# Patient Record
Sex: FEMALE | Race: ASIAN | Employment: UNEMPLOYED | URBAN - METROPOLITAN AREA
[De-identification: names, ages, dates, MRNs, and addresses within clinical notes are randomized per-mention and may not be internally consistent; named-entity substitution may affect disease eponyms.]

---

## 2017-06-22 ENCOUNTER — HOSPITAL ENCOUNTER (OUTPATIENT)
Age: 27
Discharge: HOME | End: 2017-06-22
Payer: COMMERCIAL

## 2017-06-22 VITALS
HEART RATE: 82 BPM | HEIGHT: 64 IN | BODY MASS INDEX: 23.05 KG/M2 | WEIGHT: 135 LBS | OXYGEN SATURATION: 100 % | TEMPERATURE: 99 F | SYSTOLIC BLOOD PRESSURE: 113 MMHG | DIASTOLIC BLOOD PRESSURE: 65 MMHG | RESPIRATION RATE: 20 BRPM

## 2017-06-22 DIAGNOSIS — N94.6 DYSMENORRHEA: Primary | ICD-10-CM

## 2017-06-22 PROCEDURE — 99203 OFFICE O/P NEW LOW 30 MIN: CPT

## 2017-06-22 PROCEDURE — 99202 OFFICE O/P NEW SF 15 MIN: CPT

## 2017-06-22 PROCEDURE — 81025 URINE PREGNANCY TEST: CPT

## 2017-06-22 PROCEDURE — 81002 URINALYSIS NONAUTO W/O SCOPE: CPT

## 2017-06-22 NOTE — ED PROVIDER NOTES
Patient presents with:  Abdomen/Flank Pain (GI/)      HPI:     Emily Collier is a 27year old female who presents with a chief complaint of dysmenorrhea and illness prior and during her menses.   The patient states her primary care doctor is aware, and she ha systems reviewed and negative except as noted above. Constitutional and Vital Signs Reviewed.       Physical Exam:     /65 mmHg  Pulse 82  Temp(Src) 98.8 °F (37.1 °C)  Resp 20  Ht 162.6 cm (5' 4\")  Wt 61.236 kg  BMI 23.16 kg/m2  SpO2 100%  LMP 05/29 Negative   Leukocyte esterase urine Negative Negative        MDM:  She has moderate microscopic blood in the urine, but she is due for her menses any day now. Her pregnancy test is negative. Her vital signs are stable.   She declined a pelvic exam today,

## 2017-06-27 ENCOUNTER — HOSPITAL ENCOUNTER (EMERGENCY)
Facility: HOSPITAL | Age: 27
Discharge: HOME | End: 2017-06-27
Attending: EMERGENCY MEDICINE
Payer: COMMERCIAL

## 2017-06-27 ENCOUNTER — APPOINTMENT (OUTPATIENT)
Dept: CT IMAGING | Facility: HOSPITAL | Age: 27
End: 2017-06-27
Attending: EMERGENCY MEDICINE
Payer: COMMERCIAL

## 2017-06-27 VITALS
OXYGEN SATURATION: 100 % | WEIGHT: 136 LBS | TEMPERATURE: 97 F | HEART RATE: 60 BPM | DIASTOLIC BLOOD PRESSURE: 74 MMHG | SYSTOLIC BLOOD PRESSURE: 124 MMHG | BODY MASS INDEX: 23.22 KG/M2 | RESPIRATION RATE: 16 BRPM | HEIGHT: 64 IN

## 2017-06-27 DIAGNOSIS — G44.209 TENSION-TYPE HEADACHE, NOT INTRACTABLE, UNSPECIFIED CHRONICITY PATTERN: Primary | ICD-10-CM

## 2017-06-27 DIAGNOSIS — N94.6 DYSMENORRHEA: ICD-10-CM

## 2017-06-27 DIAGNOSIS — L02.211 CUTANEOUS ABSCESS OF ABDOMINAL WALL: ICD-10-CM

## 2017-06-27 PROCEDURE — 85025 COMPLETE CBC W/AUTO DIFF WBC: CPT | Performed by: EMERGENCY MEDICINE

## 2017-06-27 PROCEDURE — 87186 SC STD MICRODIL/AGAR DIL: CPT | Performed by: EMERGENCY MEDICINE

## 2017-06-27 PROCEDURE — 96374 THER/PROPH/DIAG INJ IV PUSH: CPT

## 2017-06-27 PROCEDURE — 96375 TX/PRO/DX INJ NEW DRUG ADDON: CPT

## 2017-06-27 PROCEDURE — 81001 URINALYSIS AUTO W/SCOPE: CPT | Performed by: EMERGENCY MEDICINE

## 2017-06-27 PROCEDURE — 93005 ELECTROCARDIOGRAM TRACING: CPT

## 2017-06-27 PROCEDURE — 87205 SMEAR GRAM STAIN: CPT | Performed by: EMERGENCY MEDICINE

## 2017-06-27 PROCEDURE — 87070 CULTURE OTHR SPECIMN AEROBIC: CPT | Performed by: EMERGENCY MEDICINE

## 2017-06-27 PROCEDURE — 99285 EMERGENCY DEPT VISIT HI MDM: CPT

## 2017-06-27 PROCEDURE — 70450 CT HEAD/BRAIN W/O DYE: CPT | Performed by: EMERGENCY MEDICINE

## 2017-06-27 PROCEDURE — 93010 ELECTROCARDIOGRAM REPORT: CPT

## 2017-06-27 PROCEDURE — 81025 URINE PREGNANCY TEST: CPT

## 2017-06-27 PROCEDURE — 80053 COMPREHEN METABOLIC PANEL: CPT | Performed by: EMERGENCY MEDICINE

## 2017-06-27 PROCEDURE — 87147 CULTURE TYPE IMMUNOLOGIC: CPT | Performed by: EMERGENCY MEDICINE

## 2017-06-27 PROCEDURE — 96361 HYDRATE IV INFUSION ADD-ON: CPT

## 2017-06-27 RX ORDER — METOCLOPRAMIDE HYDROCHLORIDE 5 MG/ML
10 INJECTION INTRAMUSCULAR; INTRAVENOUS ONCE
Status: COMPLETED | OUTPATIENT
Start: 2017-06-27 | End: 2017-06-27

## 2017-06-27 RX ORDER — BUTALBITAL/ASPIRIN/CAFFEINE 50-325-40
1-2 CAPSULE ORAL EVERY 4 HOURS PRN
Qty: 12 CAPSULE | Refills: 0 | Status: SHIPPED | OUTPATIENT
Start: 2017-06-27 | End: 2018-06-28

## 2017-06-27 RX ORDER — HYDROMORPHONE HYDROCHLORIDE 1 MG/ML
0.5 INJECTION, SOLUTION INTRAMUSCULAR; INTRAVENOUS; SUBCUTANEOUS ONCE
Status: COMPLETED | OUTPATIENT
Start: 2017-06-27 | End: 2017-06-27

## 2017-06-27 RX ORDER — KETOROLAC TROMETHAMINE 30 MG/ML
30 INJECTION, SOLUTION INTRAMUSCULAR; INTRAVENOUS ONCE
Status: COMPLETED | OUTPATIENT
Start: 2017-06-27 | End: 2017-06-27

## 2017-06-27 RX ORDER — DIPHENHYDRAMINE HYDROCHLORIDE 50 MG/ML
25 INJECTION INTRAMUSCULAR; INTRAVENOUS ONCE
Status: COMPLETED | OUTPATIENT
Start: 2017-06-27 | End: 2017-06-27

## 2017-06-27 RX ORDER — SULFAMETHOXAZOLE AND TRIMETHOPRIM 800; 160 MG/1; MG/1
1 TABLET ORAL 2 TIMES DAILY
Qty: 14 TABLET | Refills: 0 | Status: ON HOLD | OUTPATIENT
Start: 2017-06-27 | End: 2017-07-04

## 2017-06-27 NOTE — ED INITIAL ASSESSMENT (HPI)
Patient said her period started last Thursday. Said she has been passing \"blood clots\" from her vagina since. Patient said she went to an urgent care that same day. Was diagnosed with dysmenorrhea.  A couple days ago, patient said she developed a headache

## 2017-06-28 NOTE — ED PROVIDER NOTES
Patient Seen in: [facility] Emergency Department    History   Patient presents with:  Headache (neurologic)  Fall (musculoskeletal, neurologic)    Stated Complaint: headache/fall/syncope    HPI    Patient is a 27-year-old woman who presents with mul above.    PSFH elements reviewed from today and agreed except as otherwise stated in HPI.     Physical Exam   ED Triage Vitals [06/27/17 1328]  BP: 111/70  Pulse: 86  Resp: 16  Temp: (!) 97.2 °F (36.2 °C)  Temp src: Temporal  SpO2: 100 %  O2 Device: None (R DIFFERENTIAL[[id number]]          Abnormal            Final result                 Please view results for these tests on the individual orders.    POCT PREGNANCY, URINE   RAINBOW DRAW BLUE   RAINBOW DRAW GOLD   RAINBOW DRAW LAVENDER   RAINBOW DRAW LIGHT GRE [address] [facility]  [phone]    In 2 days        Medications Prescribed:  Discharge Medication List as of 6/27/2017  6:44 PM    START t

## 2017-06-30 ENCOUNTER — HOSPITAL ENCOUNTER (INPATIENT)
Facility: HOSPITAL | Age: 27
LOS: 1 days | Discharge: HOME | DRG: 103 | End: 2017-07-04
Attending: EMERGENCY MEDICINE | Admitting: HOSPITALIST
Payer: COMMERCIAL

## 2017-06-30 DIAGNOSIS — R55 SYNCOPE AND COLLAPSE: Primary | ICD-10-CM

## 2017-06-30 RX ORDER — DIPHENHYDRAMINE HYDROCHLORIDE 50 MG/ML
25 INJECTION INTRAMUSCULAR; INTRAVENOUS ONCE
Status: COMPLETED | OUTPATIENT
Start: 2017-06-30 | End: 2017-07-01

## 2017-07-01 ENCOUNTER — APPOINTMENT (OUTPATIENT)
Dept: CT IMAGING | Facility: HOSPITAL | Age: 27
DRG: 103 | End: 2017-07-01
Attending: HOSPITALIST
Payer: COMMERCIAL

## 2017-07-01 ENCOUNTER — APPOINTMENT (OUTPATIENT)
Dept: MRI IMAGING | Facility: HOSPITAL | Age: 27
DRG: 103 | End: 2017-07-01
Attending: HOSPITALIST
Payer: COMMERCIAL

## 2017-07-01 ENCOUNTER — APPOINTMENT (OUTPATIENT)
Dept: GENERAL RADIOLOGY | Facility: HOSPITAL | Age: 27
DRG: 103 | End: 2017-07-01
Attending: HOSPITALIST
Payer: COMMERCIAL

## 2017-07-01 ENCOUNTER — APPOINTMENT (OUTPATIENT)
Dept: CV DIAGNOSTICS | Facility: HOSPITAL | Age: 27
DRG: 103 | End: 2017-07-01
Attending: HOSPITALIST
Payer: COMMERCIAL

## 2017-07-01 PROBLEM — R55 SYNCOPE AND COLLAPSE: Status: ACTIVE | Noted: 2017-07-01

## 2017-07-01 PROCEDURE — 80053 COMPREHEN METABOLIC PANEL: CPT | Performed by: EMERGENCY MEDICINE

## 2017-07-01 PROCEDURE — 74177 CT ABD & PELVIS W/CONTRAST: CPT | Performed by: HOSPITALIST

## 2017-07-01 PROCEDURE — 99245 OFF/OP CONSLTJ NEW/EST HI 55: CPT | Performed by: OTHER

## 2017-07-01 PROCEDURE — 71010 XR CHEST AP PORTABLE  (CPT=71010): CPT | Performed by: HOSPITALIST

## 2017-07-01 PROCEDURE — 70544 MR ANGIOGRAPHY HEAD W/O DYE: CPT | Performed by: HOSPITALIST

## 2017-07-01 PROCEDURE — 70551 MRI BRAIN STEM W/O DYE: CPT | Performed by: HOSPITALIST

## 2017-07-01 PROCEDURE — 84443 ASSAY THYROID STIM HORMONE: CPT | Performed by: EMERGENCY MEDICINE

## 2017-07-01 PROCEDURE — 84484 ASSAY OF TROPONIN QUANT: CPT | Performed by: EMERGENCY MEDICINE

## 2017-07-01 PROCEDURE — 93306 TTE W/DOPPLER COMPLETE: CPT | Performed by: HOSPITALIST

## 2017-07-01 PROCEDURE — 85025 COMPLETE CBC W/AUTO DIFF WBC: CPT | Performed by: EMERGENCY MEDICINE

## 2017-07-01 PROCEDURE — 71260 CT THORAX DX C+: CPT | Performed by: HOSPITALIST

## 2017-07-01 PROCEDURE — 99223 1ST HOSP IP/OBS HIGH 75: CPT | Performed by: HOSPITALIST

## 2017-07-01 RX ORDER — SODIUM CHLORIDE 9 MG/ML
INJECTION, SOLUTION INTRAVENOUS CONTINUOUS
Status: DISCONTINUED | OUTPATIENT
Start: 2017-07-01 | End: 2017-07-04

## 2017-07-01 RX ORDER — ACETAMINOPHEN 325 MG/1
650 TABLET ORAL EVERY 6 HOURS PRN
Status: DISCONTINUED | OUTPATIENT
Start: 2017-07-01 | End: 2017-07-04

## 2017-07-01 RX ORDER — ENOXAPARIN SODIUM 100 MG/ML
40 INJECTION SUBCUTANEOUS NIGHTLY
Status: DISCONTINUED | OUTPATIENT
Start: 2017-07-01 | End: 2017-07-04

## 2017-07-01 RX ORDER — TRAZODONE HYDROCHLORIDE 50 MG/1
50 TABLET ORAL NIGHTLY PRN
Status: DISCONTINUED | OUTPATIENT
Start: 2017-07-01 | End: 2017-07-04

## 2017-07-01 RX ORDER — BUTALBITAL, ACETAMINOPHEN AND CAFFEINE 50; 325; 40 MG/1; MG/1; MG/1
1 TABLET ORAL EVERY 4 HOURS PRN
Status: DISCONTINUED | OUTPATIENT
Start: 2017-07-01 | End: 2017-07-04

## 2017-07-01 RX ORDER — KETOROLAC TROMETHAMINE 30 MG/ML
30 INJECTION, SOLUTION INTRAMUSCULAR; INTRAVENOUS ONCE
Status: COMPLETED | OUTPATIENT
Start: 2017-07-01 | End: 2017-07-01

## 2017-07-01 RX ORDER — SULFAMETHOXAZOLE AND TRIMETHOPRIM 800; 160 MG/1; MG/1
1 TABLET ORAL 2 TIMES DAILY
Status: COMPLETED | OUTPATIENT
Start: 2017-07-01 | End: 2017-07-02

## 2017-07-01 RX ORDER — RIZATRIPTAN BENZOATE 5 MG/1
5 TABLET, ORALLY DISINTEGRATING ORAL 2 TIMES DAILY PRN
Status: DISCONTINUED | OUTPATIENT
Start: 2017-07-01 | End: 2017-07-04

## 2017-07-01 RX ORDER — DEXAMETHASONE SODIUM PHOSPHATE 10 MG/ML
10 INJECTION, SOLUTION INTRAMUSCULAR; INTRAVENOUS ONCE
Status: DISCONTINUED | OUTPATIENT
Start: 2017-07-01 | End: 2017-07-01

## 2017-07-01 RX ORDER — RIZATRIPTAN BENZOATE 5 MG/1
5 TABLET, ORALLY DISINTEGRATING ORAL
Status: DISCONTINUED | OUTPATIENT
Start: 2017-07-01 | End: 2017-07-01

## 2017-07-01 RX ORDER — ONDANSETRON 2 MG/ML
8 INJECTION INTRAMUSCULAR; INTRAVENOUS EVERY 6 HOURS PRN
Status: DISCONTINUED | OUTPATIENT
Start: 2017-07-01 | End: 2017-07-04

## 2017-07-01 RX ORDER — KETOROLAC TROMETHAMINE 30 MG/ML
15 INJECTION, SOLUTION INTRAMUSCULAR; INTRAVENOUS EVERY 6 HOURS PRN
Status: DISPENSED | OUTPATIENT
Start: 2017-07-01 | End: 2017-07-03

## 2017-07-01 NOTE — PAYOR COMM NOTE
--------------  7/1    ED  ADMISSION REVIEW            Patient presents with:  Syncope  X 3 TODAY    Stated Complaint: 3 syncopal episodes today        Patient is a 44-year-old woman coming with multiple syncopal episodes.     Also complains of headache thi deficit. She exhibits normal muscle tone. Coordination normal.   Skin: Skin is warm and dry. No rash noted. No erythema. ED Course[SA. 1]     Labs Reviewed   COMP METABOLIC PANEL (14) - Abnormal; Notable for the following:        Result Value    Gl today         Clinical Impression  Syncope and collapse    SEVERE HEADACHES    PLAN   ECHO  MRI BRAIN  EEG  NEURO CONSULT  MSSA CELLULITIS/ABSCESS ON ABDOMEN FINISHING UP BACTRIM

## 2017-07-01 NOTE — PLAN OF CARE
Problem: NEUROLOGICAL - ADULT  Goal: Absence of seizures  INTERVENTIONS  - Monitor for seizure activity  - Monitor neurological status  eeg ordered for today seizure precautions; fall precautions

## 2017-07-01 NOTE — H&P
[facility] Hospitalist History and Physical      CC: Severe HAs and syncope    PCP: Shashi Murillo MD    History of Present Illness: Patient is a 27year old female with PMH sig for recurrent MRSA skin infections presenting with ~ 7 week so significant HA and recurre CP. Says at time she has mild SOB. No diarrhea. + Nausea but no vomiting. Pregnancy test is negative. She moved to [address] from [place] in January. Has been well until the last week as above.     Her only other recent issues is recurrent skin infections 2 Skin warm and dry. No rashes or lesions.   MSK: No digit cyanosis  Neuro: CN 2-12 intact, strength intact bilateral upper and lower extremities, speech clear  Psych: AAO x 3, with appropriate affect        Data Review:    Labs:     Lab Results  Component Va weeks with PCP     FEN:  - IVF  - General diet  - Lytes prn    Proph:  - DVT proph with lovenox    Dispo:  - [facility] hospitalist service  - Consults include: Neuro    Outpatient records or previous hospital records reviewed.    [facility] hospitalist to continue to fo

## 2017-07-01 NOTE — PROGRESS NOTES
[facility] Neurology Note    Lamar Dolan Patient Status:  Observation    1990 MRN [id number]   [facility] 2NE-A Attending Yaniv Peoples   Hosp Day # 0 PCP Helena Young MD     REASON FOR EVALUATION: syncope and drugs. ALLERGIES:  No Known Allergies    MEDICATIONS:    No current outpatient prescriptions on file.     Current Facility-Administered Medications:  Sulfamethoxazole-TMP DS (BACTRIM DS) 800-160 MG per tab 1 tablet 1 tablet Oral BID   Rizatriptan Benzoat age.  No mass-effect, midline shift, hydrocephalus, herniation, extra-axial fluid collections, or signs of acute territorial infarct, or brain tumor. No abnormality of midline structures. No sign of restricted diffusion.  No pathologic gradient suscept

## 2017-07-01 NOTE — ED NOTES
Pt to ed from home with c/o passing out, pt placed on monitor, pt and family talk with MD, pt labs collected and sent, iv placed, ekg complete, family at bed side.

## 2017-07-01 NOTE — ED INITIAL ASSESSMENT (HPI)
Episodes of syncope x 3 today, + headache prior to episode. + nausea.  Seen pt 2 days ago with same sx

## 2017-07-01 NOTE — PROGRESS NOTES
NOTED CULTURE FROM ABD WALL specimen as mrsa and g+ cocci send a text message per perfect serve to Dr. Raman Villagran

## 2017-07-01 NOTE — CONSULTS
[facility] Cardiology  Consultation Note      Ana Mantilla Patient Status:  Observation    1990 MRN [id number]   [facility] 2NE-A Attending Adeel Arteaga,*   Hosp Day # 0 PCP Alida Randall MD     Reason for consult ondansetron HCl (ZOFRAN) injection 8 mg 8 mg Intravenous Q6H PRN   acetaminophen (TYLENOL) tab 650 mg 650 mg Oral Q6H PRN   0.9%  NaCl infusion  Intravenous Continuous   Rizatriptan Benzoate (MAXALT-MLT) disintegrating tab 5 mg 5 mg Oral BID PRN   Enoxap is non-displaced; there is no evidence of a sternal heave  Lungs: Clear to auscultation bilaterally; no accessory muscle use is noted  Abdomen: Soft, non-tender; bowel sounds are normoactive; no hepatosplenomegaly  Extremities: No clubbing or cyanosis; mov

## 2017-07-01 NOTE — PLAN OF CARE
Pt is AOx4, very sleepy from a combination of being awake all night in the ER and medications given in the ER. Currently denies pain but does state that she feels like the room is moving when she closes her eyes. Room air. SR on tele. Up with SBLELE.   Sma

## 2017-07-01 NOTE — H&P
[facility]  History and Physical     Kasey Britt Patient Status:  Emergency    1990 MRN [id number]   Location [address] Attending Rian York MD   Hosp Day # 0 PCP Gina Burgos MD     Chief Complaint: sycn systems was completed. Pertinent positives and negatives noted in the HPI.     Physical Exam:    /71   Pulse 87   Temp 98.3 °F (36.8 °C) (Oral)   Resp 18   Ht 5' 4\" (1.626 m)   Wt 136 lb (61.7 kg)   LMP 06/22/2017   SpO2 100%   BMI 23.34 kg/m²   G a role in this presentation (recently moved to [address] from [facility] after getting )  2.  MSSA cellulitis/abscess on abdomen-present on admit-finishing up bactrim      Quality:  · DVT Prophylaxis: scds  · CODE status: full  · Lopez: no    Plan of care discus

## 2017-07-01 NOTE — ED PROVIDER NOTES
Patient Seen in: [facility] Emergency Department    History   Patient presents with:  Syncope (cardiovascular, neurologic)    Stated Complaint: 3 syncopal episodes today    HPI    Patient is a 29-year-old woman coming with multiple syncopal episodes src: Oral  SpO2: 100 %  O2 Device: None (Room air)    Current:/71   Pulse 87   Temp 98.3 °F (36.8 °C) (Oral)   Resp 18   Ht 162.6 cm (5' 4\")   Wt 61.7 kg   LMP 06/22/2017   SpO2 100%   BMI 23.34 kg/m²         Physical Exam   Constitutional: She is o Lymphocyte Absolute 4.21 (*)     Monocyte Absolute 1.48 (*)     Eosinophil Absolute 0.37 (*)     All other components within normal limits   TSH W REFLEX TO FREE T4 - Normal   TROPONIN I - Normal   CBC WITH DIFFERENTIAL WITH PLATELET    Narrative:      The

## 2017-07-02 PROCEDURE — 99213 OFFICE O/P EST LOW 20 MIN: CPT | Performed by: OTHER

## 2017-07-02 RX ORDER — NORTRIPTYLINE HYDROCHLORIDE 10 MG/1
10 CAPSULE ORAL NIGHTLY
Status: DISCONTINUED | OUTPATIENT
Start: 2017-07-02 | End: 2017-07-03

## 2017-07-02 NOTE — PROGRESS NOTES
07/02/17 0102 07/02/17 0104 07/02/17 0105   Vital Signs   Pulse 64 91 101   Heart Rate Source Monitor Monitor Monitor   Resp 18 --  --    Respiratory Quality Normal --  --    BP 99/56 96/81 107/67   BP Location Left arm Left arm Left arm   BP Method Aut

## 2017-07-02 NOTE — PLAN OF CARE
NEUROLOGICAL - ADULT    • Achieves stable or improved neurological status Progressing    • Absence of seizures Progressing        CARDIOVASCULAR - ADULT    • Maintains optimal cardiac output and hemodynamic stability Progressing    • Absence of cardiac arr

## 2017-07-02 NOTE — PLAN OF CARE
CARDIOVASCULAR - ADULT    • Maintains optimal cardiac output and hemodynamic stability Progressing    Pt on tele, NSR on monitor. Does have tachycardia at times. Orthostatic negative.     NEUROLOGICAL - ADULT    • Achieves stable or improved neurological st

## 2017-07-02 NOTE — PROGRESS NOTES
Upon assessment this morning at approximately 0830, pt appeared to be drowsy.  was at bedside. Pt's eyes were closed, and seemed unable to keep them open.  When attempted to assess the patient and ask questions, she would mumble her answers and only

## 2017-07-02 NOTE — PROGRESS NOTES
INPATIENT NEUROLOGY PROGRESS NOTE    Date: 7/2/2017    Terence Kwon is a 27year old female at Hospital Day ( LOS: 0 days )    Assessment/Plan:   Impression/Plan:  Young female with repeated headache and syncopal episodes.     Migraine with menstrual a dysarthria  CN II-XII: intact   Motor strength: 5/5 all extremities  Tone: normal  DTRs: 2+ symmetric  Plantar response: bilateral flexor  Coordination: normal  Sensory: intact  Gait: deferred  Romberg: deferred  Neck: supple    Labs and imaging/Diagnostic

## 2017-07-02 NOTE — PROGRESS NOTES
[facility] Hospitalist Progress Note                                                                   [address]   8/1/1990    CC: FU HA    Interval History:  - Acting very tired and Marbella Alvarez noted in the Interval History above.     Assessment/Plan:  26 yo F with severe HAs and recurrent syncope     Severe HAs with syncope  - MRI/MRA look OK   - Suspect this is HA syndrome likely migraine  - Menses seems to be trigger for her  - Discussed briefl menstruation repeat in 2 weeks with PCP      FEN:  - IVF  - General diet  - Lytes prn     Proph:  - DVT proph with lovenox     Dispo:  - [facility] hospitalist service  - Consults include: Neuro, Cards     Not ready for DC today- orthostatic. Re-eval tomorrow.

## 2017-07-02 NOTE — PROGRESS NOTES
[facility] Cardiology  Progress Note    Dorothy Noel Patient Status:  Observation    1990 MRN [id number]   [facility] 2NE-A Attending Isabella Thompson   Hosp Day # 0 PCP Jurgen Lockett MD     Impression:  1.  Syncope (FIORICET, ESGIC) per tab 1 tablet 1 tablet Oral Q4H PRN   ketorolac tromethamine (TORADOL) 30 MG/ML injection 15 mg 15 mg Intravenous Q6H PRN   TraZODone HCl (DESYREL) tab 50 mg 50 mg Oral Nightly PRN   ondansetron HCl (ZOFRAN) injection 8 mg 8 mg Rupinder Martinez

## 2017-07-03 PROCEDURE — 99213 OFFICE O/P EST LOW 20 MIN: CPT | Performed by: OTHER

## 2017-07-03 RX ORDER — LIDOCAINE HYDROCHLORIDE 10 MG/ML
10 INJECTION, SOLUTION INFILTRATION; PERINEURAL ONCE
Status: DISCONTINUED | OUTPATIENT
Start: 2017-07-03 | End: 2017-07-04

## 2017-07-03 RX ORDER — LIDOCAINE HYDROCHLORIDE 10 MG/ML
INJECTION, SOLUTION EPIDURAL; INFILTRATION; INTRACAUDAL; PERINEURAL
Status: DISPENSED
Start: 2017-07-03 | End: 2017-07-04

## 2017-07-03 RX ORDER — TOPIRAMATE 25 MG/1
25 TABLET ORAL EVERY 12 HOURS SCHEDULED
Status: DISCONTINUED | OUTPATIENT
Start: 2017-07-03 | End: 2017-07-04

## 2017-07-03 NOTE — PROGRESS NOTES
[facility] Hospitalist Progress Note                                                                   [address]  8/1/1990    CC: FU HA    Interval History:  -Pt still has HA  -She appears today, seems pt is willing for LP today -- defer to neuro  -  She did recently moved here away from family to be with new  of note.     Syncope  - Appreciate cards recs- syncope has occurred only during episodes of severe HA pain  - Agree that likely

## 2017-07-03 NOTE — PROCEDURES
SKYE - ELECTROENCEPHALOGRAM (EEG) REPORT  Patient Name:  Sherwin Parra   MRN / CSN:  [id number] / [id number]   Date of Birth / Age:  8/1/1990 /  27year old   Encounter Date:  7/3/17         METHODS:  Twenty-two electrodes were applied according to the 10

## 2017-07-03 NOTE — PLAN OF CARE
Pt. Is alert and oriented times three. At times she is slow to answer and seems to become very drowsy.  at bedside. She is sinus rhythm on monitor. Lungs clear on ausculation. Pt. States headache present but not severe at the moment.

## 2017-07-03 NOTE — PROGRESS NOTES
[id number] Neu[facility] Progress Note    Keily Molina Patient Status:  Observation    1990 MRN [id number]   [facility] 2NE-A Attending Prem Padilla, [address] [facility] Day # 0 PCP Priyanka Howard MD     Chief Complaint:   Follow up fo gait    Labs:    Lab Results  Component Value Date   WBC 8.4 07/02/2017   HGB 11.5 07/02/2017   HCT 35.1 07/02/2017   .0 07/02/2017     Imaging:  No new imaging    Impression:  1.  Migraine, with menstrual associated component   - MRI Brain 7/1, unre headache pain at all and is sleepy (Depakote?) though at mention of possibly going home immediately has severe 7/10 headache and starts moaning in bed.  EEG was done and there was an event described as R arm and L leg shaking though looked more like muscle

## 2017-07-03 NOTE — PLAN OF CARE
Pt. Began thrashing around in the bed saying that her head hurt really badly. She says that her neck and back hurt. Pt. Re positioned and fiorecet to be given. ANGY Feldman notified of this intense headache which patient describes.

## 2017-07-03 NOTE — PROGRESS NOTES
[facility] [address] [place] [address] Cardiology  Progress Note    Quaker Shote Patient Status:  Observation    1990 MRN [id number]   [facility] 2NE-A Attending Rachel Chung, [address] Day # 0 PCP Marcelo Ruiz MD     Impression:  1.  Syncope suspi Nightly PRN   ondansetron HCl (ZOFRAN) injection 8 mg 8 mg Intravenous Q6H PRN   acetaminophen (TYLENOL) tab 650 mg 650 mg Oral Q6H PRN   0.9%  NaCl infusion  Intravenous Continuous   Rizatriptan Benzoate (MAXALT-MLT) disintegrating tab 5 mg 5 mg Oral BID

## 2017-07-04 VITALS
TEMPERATURE: 98 F | WEIGHT: 134.06 LBS | OXYGEN SATURATION: 99 % | RESPIRATION RATE: 18 BRPM | BODY MASS INDEX: 22.89 KG/M2 | DIASTOLIC BLOOD PRESSURE: 56 MMHG | SYSTOLIC BLOOD PRESSURE: 101 MMHG | HEART RATE: 83 BPM | HEIGHT: 64 IN

## 2017-07-04 PROCEDURE — 82962 GLUCOSE BLOOD TEST: CPT

## 2017-07-04 PROCEDURE — 90792 PSYCH DIAG EVAL W/MED SRVCS: CPT | Performed by: OTHER

## 2017-07-04 RX ORDER — TOPIRAMATE 25 MG/1
25 TABLET ORAL EVERY 12 HOURS SCHEDULED
Qty: 60 TABLET | Refills: 1 | Status: SHIPPED | OUTPATIENT
Start: 2017-07-04 | End: 2018-06-28

## 2017-07-04 RX ORDER — LORAZEPAM 0.5 MG/1
0.5 TABLET ORAL 2 TIMES DAILY PRN
Status: DISCONTINUED | OUTPATIENT
Start: 2017-07-04 | End: 2017-07-04

## 2017-07-04 RX ORDER — LORAZEPAM 0.5 MG/1
0.5 TABLET ORAL 2 TIMES DAILY PRN
Qty: 30 TABLET | Refills: 0 | Status: SHIPPED | OUTPATIENT
Start: 2017-07-04 | End: 2018-06-28

## 2017-07-04 RX ORDER — RIZATRIPTAN BENZOATE 5 MG/1
5 TABLET, ORALLY DISINTEGRATING ORAL 2 TIMES DAILY PRN
Qty: 10 TABLET | Refills: 0 | Status: SHIPPED | OUTPATIENT
Start: 2017-07-04 | End: 2018-06-28

## 2017-07-04 NOTE — DISCHARGE PLANNING
NURSING DISCHARGE NOTE    Discharged Home via Wheelchair. Accompanied by Family member, Spouse and Support staff  Belongings Taken by patient/family. Patient, , and brother given discharge instructions at the bedside.  Escorted off unit safel

## 2017-07-04 NOTE — PROGRESS NOTES
[facility] Progress Note    Brandan Carty Patient Status:  Inpatient    1990 MRN [id number]   [facility] 2NE-A Attending Dalila Noel, [address] [facility] Day # 1 PCP Sunni Sofia MD         Subjective:  Brandan Carty negative      Assessment/Plan:  1. Migraines, new onset, may be associated with menses  1. MRI Brain normal  2. Toradol, Maxalt, Fioricet PRN  3. Started Elavil 10mg qhs on 7/2, states it made her \"throw things across the room. \"  Now on Topamax 25mg BID

## 2017-07-04 NOTE — SIGNIFICANT EVENT
Was called into the room at 0945 by Dr. Authur Gosselin because the patient had a fainting episode at roughly [address] When I entered the room, noted the patient lying in her bed with her eyes closed and she was speaking faintly.   and brother at the bedside

## 2017-07-04 NOTE — CONSULTS
[facility]  Report of Psychiatric Consultation    Rhina Given Patient Status:  Inpatient    1990 MRN [id number]   [facility] 2NE-A Attending Kristie Gunderson, [address] Day # 1 PCP Sana Chun MD     Date of Admission:   psych hx was admitted 6/30/17 for management of her headaches and evaluation of her syncopal episodes.  Since her menstrual cycle began on 6/22, she has had some abd/pelvic cramps, severe temporal/occipital area headaches, and multiple episodes of lighthead He works as an . No children. She has 1 brother who lives in [place]. Cousin present in hospital lives in [place] and flew out here to support her.      Allergies:  No Known Allergies    Medications:    Current Facility-Administered Medications:   •  LO drowsy states. There was a short 8 second episode of RLE and LUE   shaking which appeared to be correlated with muscle activity but   no clear epileptiform activity during her recording.  No focal,   lateralizing, or epileptiform activity is seen and no s

## 2017-07-04 NOTE — PLAN OF CARE
CARDIOVASCULAR - ADULT    • Maintains optimal cardiac output and hemodynamic stability Progressing    • Absence of cardiac arrhythmias or at baseline Progressing        NEUROLOGICAL - ADULT    • Achieves stable or improved neurological status Progressing c/o discomfort. Minor HA overnight; not requiring medication intervention.

## 2017-07-04 NOTE — PROGRESS NOTES
[facility] Hospitalist Progress Note                                                                   [address]   8/1/1990    CC: FU HA    Interval History:  -Pt intermittently responding t syncope  - MRI/MRA look OK   - Menses seems to be trigger for her  - now on topamax- HA resolved currently  - Unclear if psychosomatic/functional component as pain and behavior seems out of proportion of HA pain vs other neuro etiology  - EEG today, seems

## 2017-07-04 NOTE — PROGRESS NOTES
PSYCH CONSULT    Date of Admission:  6/30/17  Date of Consult: 7/4/17  Reason for Consultation: Anxiety    Impression: She has no past psych hx and had 2 big life events occur in 1/2017. She got  and moved to the [address] [place] [address],[address] from [facility].  She admits to Vahid Dobbs

## 2017-07-05 NOTE — DISCHARGE SUMMARY
General Medicine Discharge Summary     Patient ID:  Tamra Berry  27year old  8/1/1990    Admit date: 6/30/2017    Discharge date and time: 7/4/2017  4:00 PM     Attending Physician: Yumiko Valdez of pts complaints -- started on ativan follow up in clinic     Syncope  - Appreciate cards recs- syncope has occurred only during episodes of severe HA pain  - Agree that likely vasovagal  - She is orthostatic- suspect 2/2 poor PO, encourage eating  - IVF medications    Sulfamethoxazole-TMP -160 MG Oral Tab per tablet          Activity: activity as tolerated  Diet: regular diet  Wound Care: as directed  Code Status: Full Code      Exam on day of discharge:      07/04/17  1345   BP: 101/56   Pulse: 83

## 2017-07-12 PROCEDURE — 87591 N.GONORRHOEAE DNA AMP PROB: CPT | Performed by: OBSTETRICS & GYNECOLOGY

## 2017-07-12 PROCEDURE — 87491 CHLMYD TRACH DNA AMP PROBE: CPT | Performed by: OBSTETRICS & GYNECOLOGY

## 2017-07-12 PROCEDURE — 88175 CYTOPATH C/V AUTO FLUID REDO: CPT | Performed by: OBSTETRICS & GYNECOLOGY

## 2017-07-13 PROBLEM — G43.009 MIGRAINE WITHOUT AURA AND WITHOUT STATUS MIGRAINOSUS, NOT INTRACTABLE: Status: ACTIVE | Noted: 2017-07-13

## 2017-07-14 NOTE — PAYOR COMM NOTE
--------------  CONTINUED STAY REVIEW    Payor: CARY Crews #:  [id number]  Authorization Number: N/A    Admit date: 6/30/2017 11:38 PM       Admitting Physician: Nataliia Baron MD  Attending Physician:  No att. providers found  Primary Care P

## 2017-07-14 NOTE — PAYOR COMM NOTE
--------------  DISCHARGE REVIEW    Payor: STEFFANIE Hodgsonpavel Gentileons #:  [id number]  Authorization Number: N/A    Admit date: 6/30/2017 11:38 PM  Discharge Date: 7/4/2017  4:00 PM     Admitting Physician: Shashi Alicia MD  Attending Physician:  Tayler att.  pro

## 2018-05-09 PROCEDURE — 87086 URINE CULTURE/COLONY COUNT: CPT | Performed by: INTERNAL MEDICINE

## 2018-06-28 PROCEDURE — 82607 VITAMIN B-12: CPT | Performed by: INTERNAL MEDICINE

## 2020-11-21 ENCOUNTER — HOSPITAL ENCOUNTER (EMERGENCY)
Facility: HOSPITAL | Age: 30
Discharge: HOME | End: 2020-11-21
Attending: EMERGENCY MEDICINE
Payer: COMMERCIAL

## 2020-11-21 ENCOUNTER — APPOINTMENT (OUTPATIENT)
Dept: ULTRASOUND IMAGING | Facility: HOSPITAL | Age: 30
End: 2020-11-21
Attending: EMERGENCY MEDICINE
Payer: COMMERCIAL

## 2020-11-21 VITALS
RESPIRATION RATE: 18 BRPM | OXYGEN SATURATION: 98 % | HEART RATE: 89 BPM | SYSTOLIC BLOOD PRESSURE: 135 MMHG | DIASTOLIC BLOOD PRESSURE: 80 MMHG | TEMPERATURE: 98 F

## 2020-11-21 DIAGNOSIS — R10.9 ABDOMINAL PAIN DURING PREGNANCY IN FIRST TRIMESTER: ICD-10-CM

## 2020-11-21 DIAGNOSIS — O26.891 ABDOMINAL PAIN DURING PREGNANCY IN FIRST TRIMESTER: ICD-10-CM

## 2020-11-21 DIAGNOSIS — Z3A.01 LESS THAN 8 WEEKS GESTATION OF PREGNANCY: Primary | ICD-10-CM

## 2020-11-21 PROCEDURE — 81001 URINALYSIS AUTO W/SCOPE: CPT | Performed by: EMERGENCY MEDICINE

## 2020-11-21 PROCEDURE — 36415 COLL VENOUS BLD VENIPUNCTURE: CPT

## 2020-11-21 PROCEDURE — 86900 BLOOD TYPING SEROLOGIC ABO: CPT | Performed by: EMERGENCY MEDICINE

## 2020-11-21 PROCEDURE — 76817 TRANSVAGINAL US OBSTETRIC: CPT | Performed by: EMERGENCY MEDICINE

## 2020-11-21 PROCEDURE — 99284 EMERGENCY DEPT VISIT MOD MDM: CPT

## 2020-11-21 PROCEDURE — 84702 CHORIONIC GONADOTROPIN TEST: CPT | Performed by: EMERGENCY MEDICINE

## 2020-11-21 PROCEDURE — 76801 OB US < 14 WKS SINGLE FETUS: CPT | Performed by: EMERGENCY MEDICINE

## 2020-11-21 PROCEDURE — 85027 COMPLETE CBC AUTOMATED: CPT | Performed by: EMERGENCY MEDICINE

## 2020-11-21 PROCEDURE — 86901 BLOOD TYPING SEROLOGIC RH(D): CPT | Performed by: EMERGENCY MEDICINE

## 2020-11-22 NOTE — ED INITIAL ASSESSMENT (HPI)
Pt c/o lower right sided abdominal pain, approx 3-days duration, w/ some \"brown\" discharge.  Pt 8-weeks pregnant

## 2020-11-22 NOTE — ED PROVIDER NOTES
Patient Seen in: [facility] Emergency Department      History   Patient presents with:  Pregnancy Issues    Stated Complaint: 8 wks pg, R sided pain, r/o ectopic,    HPI    Patient is a 53-year-old female G1, P0 comes in emergency room for evaluatio Pupils equal round reactive to light and accommodation, extraocular motion is intact, sclerae white, conjunctiva is pink. Oropharynx is unremarkable, no exudate. NECK: Supple, trachea midline, no lymphadenopathy.    LUNG: Lungs clear to auscultation bila 2. 7 cm. There is normal arterial and venous Doppler wave forms delete. The spectral analysis is within normal limits. LEFT OVARY: Left ovary is visualized and measures 2.6 x 1.1 x 1.7 cm. There is normal arterial and venous Doppler wave forms.   The spec Comprehensive verbal and written discharge and follow-up instructions were provided to help prevent relapse or worsening.   Patient was instructed to follow-up with her primary care provider for further evaluation and treatment, but to return immediately to

## 2020-11-25 ENCOUNTER — HOSPITAL ENCOUNTER (EMERGENCY)
Facility: HOSPITAL | Age: 30
Discharge: HOME | End: 2020-11-26
Attending: STUDENT IN AN ORGANIZED HEALTH CARE EDUCATION/TRAINING PROGRAM
Payer: COMMERCIAL

## 2020-11-25 DIAGNOSIS — N93.9 VAGINAL BLEEDING: ICD-10-CM

## 2020-11-25 DIAGNOSIS — O20.0 THREATENED MISCARRIAGE IN EARLY PREGNANCY: Primary | ICD-10-CM

## 2020-11-25 PROCEDURE — 86900 BLOOD TYPING SEROLOGIC ABO: CPT | Performed by: STUDENT IN AN ORGANIZED HEALTH CARE EDUCATION/TRAINING PROGRAM

## 2020-11-25 PROCEDURE — 96360 HYDRATION IV INFUSION INIT: CPT

## 2020-11-25 PROCEDURE — 85025 COMPLETE CBC W/AUTO DIFF WBC: CPT | Performed by: STUDENT IN AN ORGANIZED HEALTH CARE EDUCATION/TRAINING PROGRAM

## 2020-11-25 PROCEDURE — 84702 CHORIONIC GONADOTROPIN TEST: CPT | Performed by: STUDENT IN AN ORGANIZED HEALTH CARE EDUCATION/TRAINING PROGRAM

## 2020-11-25 PROCEDURE — 99284 EMERGENCY DEPT VISIT MOD MDM: CPT

## 2020-11-25 PROCEDURE — 80053 COMPREHEN METABOLIC PANEL: CPT | Performed by: STUDENT IN AN ORGANIZED HEALTH CARE EDUCATION/TRAINING PROGRAM

## 2020-11-25 PROCEDURE — 86901 BLOOD TYPING SEROLOGIC RH(D): CPT | Performed by: STUDENT IN AN ORGANIZED HEALTH CARE EDUCATION/TRAINING PROGRAM

## 2020-11-25 PROCEDURE — 96361 HYDRATE IV INFUSION ADD-ON: CPT

## 2020-11-26 ENCOUNTER — APPOINTMENT (OUTPATIENT)
Dept: ULTRASOUND IMAGING | Facility: HOSPITAL | Age: 30
End: 2020-11-26
Attending: STUDENT IN AN ORGANIZED HEALTH CARE EDUCATION/TRAINING PROGRAM
Payer: COMMERCIAL

## 2020-11-26 VITALS
SYSTOLIC BLOOD PRESSURE: 113 MMHG | HEIGHT: 63 IN | BODY MASS INDEX: 30.79 KG/M2 | DIASTOLIC BLOOD PRESSURE: 66 MMHG | TEMPERATURE: 98 F | WEIGHT: 173.75 LBS | RESPIRATION RATE: 20 BRPM | HEART RATE: 71 BPM | OXYGEN SATURATION: 100 %

## 2020-11-26 PROCEDURE — 76817 TRANSVAGINAL US OBSTETRIC: CPT | Performed by: STUDENT IN AN ORGANIZED HEALTH CARE EDUCATION/TRAINING PROGRAM

## 2020-11-26 PROCEDURE — 76801 OB US < 14 WKS SINGLE FETUS: CPT | Performed by: STUDENT IN AN ORGANIZED HEALTH CARE EDUCATION/TRAINING PROGRAM

## 2020-11-26 NOTE — ED PROVIDER NOTES
Patient Seen in: [facility] Emergency Department      History   Patient presents with:  Pregnancy Issues    Stated Complaint: vaginal bleeding, dizziness, 8 weeks preg    HPI    Patient is a 22-year-old female who presents emergency department repor no tenderness. Abdominal: Soft. Bowel sounds are normal, no distension and no mass. There is no tenderness. There is no rebound and no guarding. Musculoskeletal: Normal range of motion, no edema.    Neurological: alert and oriented to person, place, and Based on the history and work-up in the er there are no signs of surgical emergency. Ultrasound demonstrated a live intrauterine pregnancy with heart rate of 170 bpm.  Adjacent to the gestation the radiologist also visualized an area of hematoma.   Pa

## 2020-11-26 NOTE — ED INITIAL ASSESSMENT (HPI)
Pt reports is 8 weeks pregnant and had vaginal bleeding tonight. States she was instructed to come to the ER by her doctor states tissue was observed to be passed.

## 2021-07-03 ENCOUNTER — TELEPHONE (OUTPATIENT)
Dept: OBGYN UNIT | Facility: HOSPITAL | Age: 31
End: 2021-07-03

## 2021-07-08 ENCOUNTER — HOSPITAL ENCOUNTER (INPATIENT)
Facility: HOSPITAL | Age: 31
LOS: 4 days | Discharge: HOME | End: 2021-07-12
Attending: OBSTETRICS & GYNECOLOGY | Admitting: OBSTETRICS & GYNECOLOGY
Payer: COMMERCIAL

## 2021-07-08 ENCOUNTER — APPOINTMENT (OUTPATIENT)
Dept: OBGYN CLINIC | Facility: HOSPITAL | Age: 31
End: 2021-07-08
Payer: COMMERCIAL

## 2021-07-08 PROBLEM — Z34.90 PREGNANCY: Status: ACTIVE | Noted: 2021-07-08

## 2021-07-08 LAB
ANTIBODY SCREEN: NEGATIVE
BASOPHILS # BLD AUTO: 0.03 X10(3) UL (ref 0–0.2)
BASOPHILS NFR BLD AUTO: 0.2 %
DEPRECATED RDW RBC AUTO: 46.5 FL (ref 35.1–46.3)
EOSINOPHIL # BLD AUTO: 0.15 X10(3) UL (ref 0–0.7)
EOSINOPHIL NFR BLD AUTO: 1.1 %
ERYTHROCYTE [DISTWIDTH] IN BLOOD BY AUTOMATED COUNT: 14.7 % (ref 11–15)
GLUCOSE BLD-MCNC: 104 MG/DL (ref 70–99)
HCT VFR BLD AUTO: 40.3 %
HGB BLD-MCNC: 13.6 G/DL
IMM GRANULOCYTES # BLD AUTO: 0.09 X10(3) UL (ref 0–1)
IMM GRANULOCYTES NFR BLD: 0.7 %
LYMPHOCYTES # BLD AUTO: 3.32 X10(3) UL (ref 1–4)
LYMPHOCYTES NFR BLD AUTO: 24.9 %
MCH RBC QN AUTO: 29.2 PG (ref 26–34)
MCHC RBC AUTO-ENTMCNC: 33.7 G/DL (ref 31–37)
MCV RBC AUTO: 86.7 FL
MONOCYTES # BLD AUTO: 1.08 X10(3) UL (ref 0.1–1)
MONOCYTES NFR BLD AUTO: 8.1 %
NEUTROPHILS # BLD AUTO: 8.69 X10 (3) UL (ref 1.5–7.7)
NEUTROPHILS # BLD AUTO: 8.69 X10(3) UL (ref 1.5–7.7)
NEUTROPHILS NFR BLD AUTO: 65 %
PLATELET # BLD AUTO: 311 10(3)UL (ref 150–450)
RBC # BLD AUTO: 4.65 X10(6)UL
RH BLOOD TYPE: POSITIVE
SARS-COV-2 RNA RESP QL NAA+PROBE: NOT DETECTED
T PALLIDUM AB SER QL IA: NONREACTIVE
WBC # BLD AUTO: 13.4 X10(3) UL (ref 4–11)

## 2021-07-08 PROCEDURE — 3E0P7VZ INTRODUCTION OF HORMONE INTO FEMALE REPRODUCTIVE, VIA NATURAL OR ARTIFICIAL OPENING: ICD-10-PCS | Performed by: OBSTETRICS & GYNECOLOGY

## 2021-07-08 RX ORDER — DEXTROSE, SODIUM CHLORIDE, SODIUM LACTATE, POTASSIUM CHLORIDE, AND CALCIUM CHLORIDE 5; .6; .31; .03; .02 G/100ML; G/100ML; G/100ML; G/100ML; G/100ML
INJECTION, SOLUTION INTRAVENOUS CONTINUOUS
Status: DISCONTINUED | OUTPATIENT
Start: 2021-07-08 | End: 2021-07-09

## 2021-07-08 RX ORDER — ACETAMINOPHEN 500 MG
500 TABLET ORAL EVERY 6 HOURS PRN
Status: DISCONTINUED | OUTPATIENT
Start: 2021-07-08 | End: 2021-07-09

## 2021-07-08 RX ORDER — SODIUM CHLORIDE, SODIUM LACTATE, POTASSIUM CHLORIDE, CALCIUM CHLORIDE 600; 310; 30; 20 MG/100ML; MG/100ML; MG/100ML; MG/100ML
INJECTION, SOLUTION INTRAVENOUS CONTINUOUS
Status: DISCONTINUED | OUTPATIENT
Start: 2021-07-08 | End: 2021-07-09

## 2021-07-08 RX ORDER — AMMONIA INHALANTS 0.04 G/.3ML
0.3 INHALANT RESPIRATORY (INHALATION) AS NEEDED
Status: DISCONTINUED | OUTPATIENT
Start: 2021-07-08 | End: 2021-07-09

## 2021-07-08 RX ORDER — IBUPROFEN 600 MG/1
600 TABLET ORAL EVERY 6 HOURS PRN
Status: DISCONTINUED | OUTPATIENT
Start: 2021-07-08 | End: 2021-07-09

## 2021-07-08 RX ORDER — DEXTROSE MONOHYDRATE 25 G/50ML
50 INJECTION, SOLUTION INTRAVENOUS
Status: DISCONTINUED | OUTPATIENT
Start: 2021-07-08 | End: 2021-07-09

## 2021-07-08 RX ORDER — TERBUTALINE SULFATE 1 MG/ML
0.25 INJECTION, SOLUTION SUBCUTANEOUS AS NEEDED
Status: DISCONTINUED | OUTPATIENT
Start: 2021-07-08 | End: 2021-07-09

## 2021-07-08 RX ORDER — ONDANSETRON 2 MG/ML
4 INJECTION INTRAMUSCULAR; INTRAVENOUS EVERY 6 HOURS PRN
Status: DISCONTINUED | OUTPATIENT
Start: 2021-07-08 | End: 2021-07-09

## 2021-07-08 RX ORDER — TRISODIUM CITRATE DIHYDRATE AND CITRIC ACID MONOHYDRATE 500; 334 MG/5ML; MG/5ML
30 SOLUTION ORAL AS NEEDED
Status: COMPLETED | OUTPATIENT
Start: 2021-07-08 | End: 2021-07-09

## 2021-07-09 ENCOUNTER — ANESTHESIA EVENT (OUTPATIENT)
Dept: OBGYN UNIT | Facility: HOSPITAL | Age: 31
End: 2021-07-09
Payer: COMMERCIAL

## 2021-07-09 ENCOUNTER — ANESTHESIA (OUTPATIENT)
Dept: OBGYN UNIT | Facility: HOSPITAL | Age: 31
End: 2021-07-09
Payer: COMMERCIAL

## 2021-07-09 LAB — GLUCOSE BLD-MCNC: 88 MG/DL (ref 70–99)

## 2021-07-09 PROCEDURE — 3E033VJ INTRODUCTION OF OTHER HORMONE INTO PERIPHERAL VEIN, PERCUTANEOUS APPROACH: ICD-10-PCS | Performed by: OBSTETRICS & GYNECOLOGY

## 2021-07-09 PROCEDURE — 10907ZC DRAINAGE OF AMNIOTIC FLUID, THERAPEUTIC FROM PRODUCTS OF CONCEPTION, VIA NATURAL OR ARTIFICIAL OPENING: ICD-10-PCS | Performed by: OBSTETRICS & GYNECOLOGY

## 2021-07-09 PROCEDURE — 59514 CESAREAN DELIVERY ONLY: CPT | Performed by: OBSTETRICS & GYNECOLOGY

## 2021-07-09 RX ORDER — CEFAZOLIN SODIUM/WATER 2 G/20 ML
SYRINGE (ML) INTRAVENOUS
Status: COMPLETED
Start: 2021-07-09 | End: 2021-07-09

## 2021-07-09 RX ORDER — MORPHINE SULFATE 0.5 MG/ML
4 INJECTION, SOLUTION EPIDURAL; INTRATHECAL; INTRAVENOUS ONCE
Status: DISCONTINUED | OUTPATIENT
Start: 2021-07-09 | End: 2021-07-09

## 2021-07-09 RX ORDER — KETOROLAC TROMETHAMINE 30 MG/ML
30 INJECTION, SOLUTION INTRAMUSCULAR; INTRAVENOUS ONCE AS NEEDED
Status: COMPLETED | OUTPATIENT
Start: 2021-07-09 | End: 2021-07-09

## 2021-07-09 RX ORDER — NALBUPHINE HCL 10 MG/ML
2.5 AMPUL (ML) INJECTION
Status: DISCONTINUED | OUTPATIENT
Start: 2021-07-09 | End: 2021-07-09

## 2021-07-09 RX ORDER — NALOXONE HYDROCHLORIDE 0.4 MG/ML
0.08 INJECTION, SOLUTION INTRAMUSCULAR; INTRAVENOUS; SUBCUTANEOUS
Status: ACTIVE | OUTPATIENT
Start: 2021-07-09 | End: 2021-07-10

## 2021-07-09 RX ORDER — METOCLOPRAMIDE HYDROCHLORIDE 5 MG/ML
INJECTION INTRAMUSCULAR; INTRAVENOUS AS NEEDED
Status: DISCONTINUED | OUTPATIENT
Start: 2021-07-09 | End: 2021-07-10 | Stop reason: SURG

## 2021-07-09 RX ORDER — HYDROMORPHONE HYDROCHLORIDE 1 MG/ML
0.4 INJECTION, SOLUTION INTRAMUSCULAR; INTRAVENOUS; SUBCUTANEOUS EVERY 2 HOUR PRN
Status: DISCONTINUED | OUTPATIENT
Start: 2021-07-09 | End: 2021-07-12

## 2021-07-09 RX ORDER — BUPIVACAINE HCL/0.9 % NACL/PF 0.25 %
5 PLASTIC BAG, INJECTION (ML) EPIDURAL AS NEEDED
Status: DISCONTINUED | OUTPATIENT
Start: 2021-07-09 | End: 2021-07-09

## 2021-07-09 RX ORDER — KETOROLAC TROMETHAMINE 30 MG/ML
INJECTION, SOLUTION INTRAMUSCULAR; INTRAVENOUS
Status: COMPLETED
Start: 2021-07-09 | End: 2021-07-09

## 2021-07-09 RX ORDER — DIPHENHYDRAMINE HCL 25 MG
25 CAPSULE ORAL EVERY 4 HOURS PRN
Status: DISCONTINUED | OUTPATIENT
Start: 2021-07-09 | End: 2021-07-12

## 2021-07-09 RX ORDER — MORPHINE SULFATE 2 MG/ML
INJECTION, SOLUTION INTRAMUSCULAR; INTRAVENOUS AS NEEDED
Status: DISCONTINUED | OUTPATIENT
Start: 2021-07-09 | End: 2021-07-10 | Stop reason: SURG

## 2021-07-09 RX ORDER — MORPHINE SULFATE 4 MG/ML
2 INJECTION, SOLUTION INTRAMUSCULAR; INTRAVENOUS EVERY 5 MIN PRN
Status: DISCONTINUED | OUTPATIENT
Start: 2021-07-09 | End: 2021-07-09

## 2021-07-09 RX ORDER — MORPHINE SULFATE 4 MG/ML
INJECTION, SOLUTION INTRAMUSCULAR; INTRAVENOUS
Status: COMPLETED
Start: 2021-07-09 | End: 2021-07-09

## 2021-07-09 RX ORDER — METOCLOPRAMIDE HYDROCHLORIDE 5 MG/ML
10 INJECTION INTRAMUSCULAR; INTRAVENOUS EVERY 6 HOURS PRN
Status: DISCONTINUED | OUTPATIENT
Start: 2021-07-09 | End: 2021-07-12

## 2021-07-09 RX ORDER — BISACODYL 10 MG
10 SUPPOSITORY, RECTAL RECTAL
Status: DISCONTINUED | OUTPATIENT
Start: 2021-07-09 | End: 2021-07-12

## 2021-07-09 RX ORDER — DOCUSATE SODIUM 100 MG/1
100 CAPSULE, LIQUID FILLED ORAL
Status: DISCONTINUED | OUTPATIENT
Start: 2021-07-10 | End: 2021-07-12

## 2021-07-09 RX ORDER — DEXTROSE MONOHYDRATE 25 G/50ML
50 INJECTION, SOLUTION INTRAVENOUS
Status: DISCONTINUED | OUTPATIENT
Start: 2021-07-09 | End: 2021-07-09

## 2021-07-09 RX ORDER — KETOROLAC TROMETHAMINE 30 MG/ML
30 INJECTION, SOLUTION INTRAMUSCULAR; INTRAVENOUS EVERY 6 HOURS
Status: DISPENSED | OUTPATIENT
Start: 2021-07-09 | End: 2021-07-10

## 2021-07-09 RX ORDER — DEXTROSE, SODIUM CHLORIDE, SODIUM LACTATE, POTASSIUM CHLORIDE, AND CALCIUM CHLORIDE 5; .6; .31; .03; .02 G/100ML; G/100ML; G/100ML; G/100ML; G/100ML
INJECTION, SOLUTION INTRAVENOUS CONTINUOUS PRN
Status: DISCONTINUED | OUTPATIENT
Start: 2021-07-09 | End: 2021-07-12

## 2021-07-09 RX ORDER — KETOROLAC TROMETHAMINE 30 MG/ML
30 INJECTION, SOLUTION INTRAMUSCULAR; INTRAVENOUS EVERY 6 HOURS
Status: DISCONTINUED | OUTPATIENT
Start: 2021-07-09 | End: 2021-07-09

## 2021-07-09 RX ORDER — SODIUM CHLORIDE, SODIUM LACTATE, POTASSIUM CHLORIDE, CALCIUM CHLORIDE 600; 310; 30; 20 MG/100ML; MG/100ML; MG/100ML; MG/100ML
INJECTION, SOLUTION INTRAVENOUS CONTINUOUS
Status: DISCONTINUED | OUTPATIENT
Start: 2021-07-09 | End: 2021-07-09

## 2021-07-09 RX ORDER — LIDOCAINE HYDROCHLORIDE 10 MG/ML
INJECTION, SOLUTION EPIDURAL; INFILTRATION; INTRACAUDAL; PERINEURAL AS NEEDED
Status: DISCONTINUED | OUTPATIENT
Start: 2021-07-09 | End: 2021-07-10 | Stop reason: SURG

## 2021-07-09 RX ORDER — MORPHINE SULFATE 4 MG/ML
2 INJECTION, SOLUTION INTRAMUSCULAR; INTRAVENOUS EVERY 2 HOUR PRN
Status: ACTIVE | OUTPATIENT
Start: 2021-07-09 | End: 2021-07-10

## 2021-07-09 RX ORDER — LIDOCAINE HYDROCHLORIDE AND EPINEPHRINE 15; 5 MG/ML; UG/ML
INJECTION, SOLUTION EPIDURAL AS NEEDED
Status: DISCONTINUED | OUTPATIENT
Start: 2021-07-09 | End: 2021-07-10 | Stop reason: SURG

## 2021-07-09 RX ORDER — CHLOROPROCAINE HYDROCHLORIDE 30 MG/ML
INJECTION, SOLUTION EPIDURAL; INFILTRATION; INTRACAUDAL; PERINEURAL AS NEEDED
Status: DISCONTINUED | OUTPATIENT
Start: 2021-07-09 | End: 2021-07-10 | Stop reason: SURG

## 2021-07-09 RX ORDER — OXYCODONE HYDROCHLORIDE 5 MG/1
5 TABLET ORAL EVERY 6 HOURS PRN
Status: DISCONTINUED | OUTPATIENT
Start: 2021-07-09 | End: 2021-07-12

## 2021-07-09 RX ORDER — HYDROMORPHONE HYDROCHLORIDE 1 MG/ML
0.2 INJECTION, SOLUTION INTRAMUSCULAR; INTRAVENOUS; SUBCUTANEOUS EVERY 2 HOUR PRN
Status: DISCONTINUED | OUTPATIENT
Start: 2021-07-09 | End: 2021-07-12

## 2021-07-09 RX ORDER — NALBUPHINE HCL 10 MG/ML
2.5 AMPUL (ML) INJECTION EVERY 4 HOURS PRN
Status: DISCONTINUED | OUTPATIENT
Start: 2021-07-09 | End: 2021-07-12

## 2021-07-09 RX ORDER — IBUPROFEN 600 MG/1
600 TABLET ORAL EVERY 6 HOURS
Status: DISCONTINUED | OUTPATIENT
Start: 2021-07-10 | End: 2021-07-12

## 2021-07-09 RX ORDER — DIPHENHYDRAMINE HYDROCHLORIDE 50 MG/ML
12.5 INJECTION INTRAMUSCULAR; INTRAVENOUS EVERY 4 HOURS PRN
Status: DISCONTINUED | OUTPATIENT
Start: 2021-07-09 | End: 2021-07-12

## 2021-07-09 RX ORDER — ONDANSETRON 2 MG/ML
INJECTION INTRAMUSCULAR; INTRAVENOUS AS NEEDED
Status: DISCONTINUED | OUTPATIENT
Start: 2021-07-09 | End: 2021-07-10 | Stop reason: SURG

## 2021-07-09 RX ORDER — DEXAMETHASONE SODIUM PHOSPHATE 4 MG/ML
VIAL (ML) INJECTION AS NEEDED
Status: DISCONTINUED | OUTPATIENT
Start: 2021-07-09 | End: 2021-07-10 | Stop reason: SURG

## 2021-07-09 RX ORDER — ACETAMINOPHEN 500 MG
1000 TABLET ORAL EVERY 6 HOURS
Status: DISCONTINUED | OUTPATIENT
Start: 2021-07-09 | End: 2021-07-12

## 2021-07-09 RX ORDER — ONDANSETRON 2 MG/ML
4 INJECTION INTRAMUSCULAR; INTRAVENOUS ONCE AS NEEDED
Status: DISCONTINUED | OUTPATIENT
Start: 2021-07-09 | End: 2021-07-09

## 2021-07-09 RX ORDER — ONDANSETRON 2 MG/ML
4 INJECTION INTRAMUSCULAR; INTRAVENOUS EVERY 6 HOURS PRN
Status: DISCONTINUED | OUTPATIENT
Start: 2021-07-09 | End: 2021-07-12

## 2021-07-09 RX ORDER — CEFAZOLIN SODIUM/WATER 2 G/20 ML
2 SYRINGE (ML) INTRAVENOUS ONCE
Status: DISCONTINUED | OUTPATIENT
Start: 2021-07-09 | End: 2021-07-09

## 2021-07-09 RX ORDER — DIPHENHYDRAMINE HYDROCHLORIDE 50 MG/ML
25 INJECTION INTRAMUSCULAR; INTRAVENOUS ONCE AS NEEDED
Status: DISCONTINUED | OUTPATIENT
Start: 2021-07-09 | End: 2021-07-09

## 2021-07-09 RX ORDER — SIMETHICONE 80 MG
80 TABLET,CHEWABLE ORAL 3 TIMES DAILY PRN
Status: DISCONTINUED | OUTPATIENT
Start: 2021-07-09 | End: 2021-07-12

## 2021-07-09 RX ADMIN — CHLOROPROCAINE HYDROCHLORIDE 4 ML: 30 INJECTION, SOLUTION EPIDURAL; INFILTRATION; INTRACAUDAL; PERINEURAL at 16:38:00

## 2021-07-09 RX ADMIN — DEXAMETHASONE SODIUM PHOSPHATE 4 MG: 4 MG/ML VIAL (ML) INJECTION at 16:52:00

## 2021-07-09 RX ADMIN — CHLOROPROCAINE HYDROCHLORIDE 10 ML: 30 INJECTION, SOLUTION EPIDURAL; INFILTRATION; INTRACAUDAL; PERINEURAL at 16:35:00

## 2021-07-09 RX ADMIN — MORPHINE SULFATE 4 MG: 2 INJECTION, SOLUTION INTRAMUSCULAR; INTRAVENOUS at 16:55:00

## 2021-07-09 RX ADMIN — LIDOCAINE HYDROCHLORIDE AND EPINEPHRINE 3 ML: 15; 5 INJECTION, SOLUTION EPIDURAL at 13:17:00

## 2021-07-09 RX ADMIN — ONDANSETRON 4 MG: 2 INJECTION INTRAMUSCULAR; INTRAVENOUS at 16:42:00

## 2021-07-09 RX ADMIN — LIDOCAINE HYDROCHLORIDE 25 MG: 10 INJECTION, SOLUTION EPIDURAL; INFILTRATION; INTRACAUDAL; PERINEURAL at 13:11:00

## 2021-07-09 RX ADMIN — METOCLOPRAMIDE HYDROCHLORIDE 10 MG: 5 INJECTION INTRAMUSCULAR; INTRAVENOUS at 16:45:00

## 2021-07-09 NOTE — ANESTHESIA PREPROCEDURE EVALUATION
PRE-OP EVALUATION    Patient Name: Rodo Hudson    Admit Diagnosis: Pregnancy    Pre-op Diagnosis: * No surgery found *        Anesthesia Procedure: LABOR ANALGESIA    * Surgery not found *    Pre-op vitals reviewed.   Temp: 97.7 °F (36.5 °C)  Pulse: 7 Intravenous, Continuous        Outpatient Medications:   famoTIDine 1.3 mg/ml Oral Suspension, Take 0.5 mg/kg by mouth 2 (two) times daily. , Disp: , Rfl: , 7/8/2021 at Unknown time  Prenatal MV-Min-Fe Fum-FA-DHA (PRENATAL 1 OR), Take by mouth., Disp: , Rfl epidural   patient meets guidelines. Post-procedure pain management plan discussed with surgeon and patient. Comment: Epidural anesthesia discussed. Risk of headache, infection, bleeding, nerve injury and failed block explained.  All questions answer

## 2021-07-09 NOTE — PLAN OF CARE
Problem: BIRTH - VAGINAL/ SECTION  Goal: Fetal and maternal status remain reassuring during the birth process  Description: INTERVENTIONS:  - Monitor vital signs  - Monitor fetal heart rate  - Monitor uterine activity  - Monitor labor progression Patient's Long Term Goal: Uncomplicated delivery Process    Interventions:  -   - See additional Care Plan goals for specific interventions  Outcome: Progressing  Goal: Patient/Family Short Term Goal  Description: Patient's Short Term Goal: Adequate Pain c

## 2021-07-09 NOTE — PLAN OF CARE
Problem: BIRTH - VAGINAL/ SECTION  Goal: Fetal and maternal status remain reassuring during the birth process  Description: INTERVENTIONS:  - Monitor vital signs  - Monitor fetal heart rate  - Monitor uterine activity  - Monitor labor progression within target range  - Assess barriers to adequate nutritional intake and initiate nutrition consult as needed  - Instruct patient on self management of diabetes  7/9/2021 1703 by Joy Adams RN  Outcome: Completed  7/9/2021 0804 by Joy Adams RN  O

## 2021-07-09 NOTE — ANESTHESIA PROCEDURE NOTES
Labor Analgesia  Performed by: Billy Tristan MD  Authorized by: Billy Tristan MD       General Information and Staff    Start Time:  7/9/2021 1:04 PM  End Time:  7/9/2021 1:21 PM  Anesthesiologist:  Billy Tristan MD  Performed by:   Anesthesiologist  Jeannie Wood

## 2021-07-09 NOTE — OPERATIVE REPORT
[facility]   Section - Operative Note    Te Dys Patient Status:  Inpatient    1990 MRN [id number]   Location [address] [facility] Attending Jolene Foote MD   Hosp Day # 1 PCP Daysi Medina DO       Preoperative The cord was clamped and cut after a 30 second delay and infant was handed to the awaiting neonatologist. The placenta was delivered manually intact with a 3 vessel cord. The uterus was cleared of all clots and debris.   The Ammon O retractor was plac

## 2021-07-09 NOTE — H&P
[address] [facility] Patient Status:  Inpatient    1990 MRN [id number]   Location [address] [facility] Attending Savannah Sorenson MD   Hosp Day # 1 PCP Bettie Wayne DO     SUBJECTIVE:    Evi Porter Regular rate   Abdomen:  Gravid, soft and non-tender, +FHT   Fetal Surveillance: NST reactive   Cervix: 2-3/80/-1     Lab Review:    O+  GBS neg    Lab Results   Component Value Date    WBC 13.4 07/08/2021    HGB 13.6 07/08/2021    HCT 40.3 07/08/2021    P

## 2021-07-09 NOTE — PROGRESS NOTES
Called to evaluate tracing. Has had intermittent late decels. Position changed, pitocin turned off, BP treated. Improved so pitocin restarted, decels resumed. Even with normal interventions, decels persisted. SVE: 3-4cm, unchanged from prior.  Reviewed [facility]

## 2021-07-09 NOTE — PROGRESS NOTES
Pt is a 31year old female admitted to -A. Patient presents with:  Scheduled Induction: GDM     Pt is  39w6d intra-uterine pregnancy. History obtained, consents signed. Oriented to room, staff, and plan of care.

## 2021-07-09 NOTE — PAYOR COMM NOTE
--------------  ADMISSION REVIEW     Payor: [address] #:  [id number]  Authorization Number: [id number]    Admit date: 21  Admit time:  8:19 PM         History & Physical    Chapin Lovell is a 31year old  lidocaine PF (XYLOCAINE) 1% injection     Date Action Dose Route User    7/9/2021 1311 Given 25 mg Infiltration Smitha Oliva MD      lidocaine 1.5%-EPINEPHrine 1:200,000 (XYLOCAINE/EPINEPHRINE) injection     Date Action Dose Route User    7/9/2021 1317

## 2021-07-10 LAB
BASOPHILS # BLD AUTO: 0.02 X10(3) UL (ref 0–0.2)
BASOPHILS NFR BLD AUTO: 0.1 %
DEPRECATED RDW RBC AUTO: 48.1 FL (ref 35.1–46.3)
EOSINOPHIL # BLD AUTO: 0.01 X10(3) UL (ref 0–0.7)
EOSINOPHIL NFR BLD AUTO: 0.1 %
ERYTHROCYTE [DISTWIDTH] IN BLOOD BY AUTOMATED COUNT: 14.7 % (ref 11–15)
HCT VFR BLD AUTO: 36.7 %
HGB BLD-MCNC: 12.4 G/DL
IMM GRANULOCYTES # BLD AUTO: 0.11 X10(3) UL (ref 0–1)
IMM GRANULOCYTES NFR BLD: 0.6 %
LYMPHOCYTES # BLD AUTO: 3.22 X10(3) UL (ref 1–4)
LYMPHOCYTES NFR BLD AUTO: 18 %
MCH RBC QN AUTO: 30.2 PG (ref 26–34)
MCHC RBC AUTO-ENTMCNC: 33.8 G/DL (ref 31–37)
MCV RBC AUTO: 89.3 FL
MONOCYTES # BLD AUTO: 1.42 X10(3) UL (ref 0.1–1)
MONOCYTES NFR BLD AUTO: 7.9 %
NEUTROPHILS # BLD AUTO: 13.13 X10 (3) UL (ref 1.5–7.7)
NEUTROPHILS # BLD AUTO: 13.13 X10(3) UL (ref 1.5–7.7)
NEUTROPHILS NFR BLD AUTO: 73.3 %
PLATELET # BLD AUTO: 246 10(3)UL (ref 150–450)
RBC # BLD AUTO: 4.11 X10(6)UL
WBC # BLD AUTO: 17.9 X10(3) UL (ref 4–11)

## 2021-07-10 NOTE — PROGRESS NOTES
OB Progress Note POD#1    S: She is feeling well. Minimal VB. Pain controlled. Breastfeeding.  Lopez removed, voided a very small amount just now. + flatus, no BM    O:  Blood pressure 110/64, pulse 62, temperature 98.7 °F (37.1 °C), temperature source Oral

## 2021-07-10 NOTE — PROGRESS NOTES
[facility] 2SW-J n    [address] [employer] Patient Status:  Inpatient   Age/Gender 31year old female MRN [id number]   Location [facility] 2SW-J Attending Libby Price MD   [facility] Day # 2 PCP Karen Clemente, DO      Anesthesia Pain Progress N

## 2021-07-10 NOTE — PROGRESS NOTES
Patient admitted to room #2211. Spouse along with belongings. Patient transferred self to bed. ID bands verified with Labor RN. Hugs and kisses already in place. Patient and  oriented to room and plan of care. Bed low and locked. Side rails up x 2.

## 2021-07-10 NOTE — ANESTHESIA POSTPROCEDURE EVALUATION
[address] [facility] Patient Status:  Inpatient   Age/Gender 31year old female MRN [id number]   Location [facility] 2SW-J Attending Raymundo Barfield MD   [facility] Day # 2 PCP Edwin Turner DO       Anesthesia Post-op Note     SECTIO

## 2021-07-11 LAB
BASOPHILS # BLD AUTO: 0.04 X10(3) UL (ref 0–0.2)
BASOPHILS NFR BLD AUTO: 0.3 %
DEPRECATED RDW RBC AUTO: 50.1 FL (ref 35.1–46.3)
EOSINOPHIL # BLD AUTO: 0.14 X10(3) UL (ref 0–0.7)
EOSINOPHIL NFR BLD AUTO: 1 %
ERYTHROCYTE [DISTWIDTH] IN BLOOD BY AUTOMATED COUNT: 15 % (ref 11–15)
HCT VFR BLD AUTO: 40.1 %
HGB BLD-MCNC: 12.9 G/DL
IMM GRANULOCYTES # BLD AUTO: 0.11 X10(3) UL (ref 0–1)
IMM GRANULOCYTES NFR BLD: 0.8 %
LYMPHOCYTES # BLD AUTO: 3.4 X10(3) UL (ref 1–4)
LYMPHOCYTES NFR BLD AUTO: 24.1 %
MCH RBC QN AUTO: 29.3 PG (ref 26–34)
MCHC RBC AUTO-ENTMCNC: 32.2 G/DL (ref 31–37)
MCV RBC AUTO: 91.1 FL
MONOCYTES # BLD AUTO: 1.23 X10(3) UL (ref 0.1–1)
MONOCYTES NFR BLD AUTO: 8.7 %
NEUTROPHILS # BLD AUTO: 9.16 X10 (3) UL (ref 1.5–7.7)
NEUTROPHILS # BLD AUTO: 9.16 X10(3) UL (ref 1.5–7.7)
NEUTROPHILS NFR BLD AUTO: 65.1 %
PLATELET # BLD AUTO: 255 10(3)UL (ref 150–450)
RBC # BLD AUTO: 4.4 X10(6)UL
WBC # BLD AUTO: 14.1 X10(3) UL (ref 4–11)

## 2021-07-11 RX ORDER — IBUPROFEN 600 MG/1
600 TABLET ORAL EVERY 6 HOURS PRN
Qty: 30 TABLET | Refills: 0 | Status: SHIPPED | OUTPATIENT
Start: 2021-07-11 | End: 2021-08-18 | Stop reason: ALTCHOICE

## 2021-07-11 RX ORDER — BREAST PUMP
1 EACH MISCELLANEOUS AS NEEDED
Qty: 1 EACH | Refills: 0 | Status: SHIPPED | OUTPATIENT
Start: 2021-07-11

## 2021-07-11 RX ORDER — HYDROCODONE BITARTRATE AND ACETAMINOPHEN 5; 325 MG/1; MG/1
1 TABLET ORAL EVERY 6 HOURS PRN
Qty: 10 TABLET | Refills: 0 | Status: SHIPPED | OUTPATIENT
Start: 2021-07-11 | End: 2021-08-18 | Stop reason: ALTCHOICE

## 2021-07-11 NOTE — PROGRESS NOTES
OB Progress Note POD#2    S: She is feeling well. Minimal VB. Pain controlled. Breastfeeding and bottle feeding.  Voiding without difficulty, + flatus, + BM    O:  Blood pressure 113/84, pulse 73, temperature 98.7 °F (37.1 °C), temperature source Oral, resp

## 2021-07-12 VITALS
SYSTOLIC BLOOD PRESSURE: 126 MMHG | WEIGHT: 185 LBS | RESPIRATION RATE: 16 BRPM | DIASTOLIC BLOOD PRESSURE: 76 MMHG | BODY MASS INDEX: 32.78 KG/M2 | HEART RATE: 75 BPM | OXYGEN SATURATION: 100 % | TEMPERATURE: 98 F | HEIGHT: 63 IN

## 2021-07-12 RX ORDER — HYDROCODONE BITARTRATE AND ACETAMINOPHEN 5; 325 MG/1; MG/1
1-2 TABLET ORAL EVERY 4 HOURS PRN
Qty: 15 TABLET | Refills: 0 | Status: SHIPPED | OUTPATIENT
Start: 2021-07-12 | End: 2021-08-18 | Stop reason: ALTCHOICE

## 2021-07-12 NOTE — PROGRESS NOTES
NURSING DISCHARGE NOTE    Patient escorted off mother baby unit by PCT via wheel chair and discharged home in stable condition.

## 2021-07-12 NOTE — PROGRESS NOTES
[facility]  Post-Partum Caesarean Section Progress Note    Honolulufeliciano Jackson Patient Status:  Inpatient    1990 MRN [id number]   [facility] 2SW-J Attending Bhavin Ramírez MD   Hosp Day # 4 PCP Priscila Simon DO     SUBJECTIVE:    Alma Coe

## 2021-07-12 NOTE — PROGRESS NOTES
Discharge instructions reviewed with patient and . Patient encouraged to ask questions and discharge paperwork provided. Teal band explained and given to patient. Patient encouraged to make follow up appointment with OB/GYN for 4-6 weeks postpartum.

## 2021-07-14 ENCOUNTER — TELEPHONE (OUTPATIENT)
Dept: OBGYN UNIT | Facility: HOSPITAL | Age: 31
End: 2021-07-14

## 2021-07-14 NOTE — DISCHARGE SUMMARY
[facility] Discharge Summary    Frandy Salmeron Patient Status:  Inpatient    1990 MRN [id number]   [facility] 2SW-J Attending No att. providers found   Hosp Day # 4 PCP Figueroa Gregory DO     Date of Admission: 2021  Date

## 2021-07-16 ENCOUNTER — NURSE ONLY (OUTPATIENT)
Dept: LACTATION | Facility: HOSPITAL | Age: 31
End: 2021-07-16
Attending: OBSTETRICS & GYNECOLOGY
Payer: COMMERCIAL

## 2021-07-16 VITALS — TEMPERATURE: 98 F

## 2021-07-16 DIAGNOSIS — O92.5 SUPPRESSED LACTATION, POSTPARTUM CONDITION OR COMPLICATION: Primary | ICD-10-CM

## 2021-07-16 PROCEDURE — 99213 OFFICE O/P EST LOW 20 MIN: CPT

## 2021-07-16 NOTE — PROGRESS NOTES
LACTATION NOTE - MOTHER      Evaluation Type: Outpatient Initial    Problems identified  Problems identified: Knowledge deficit;Milk supply not WNL; Unable to acheive sustained latch  Milk supply not WNL: Reduced (potential)  Problems Identified Other: Infa

## 2021-07-19 ENCOUNTER — TELEPHONE (OUTPATIENT)
Dept: OBGYN UNIT | Facility: HOSPITAL | Age: 31
End: 2021-07-19

## 2021-07-19 NOTE — PROGRESS NOTES
07/19/21 0933   Cradle Call Follow up   Cradle call follow up date 07/19/21   Follow up needed Completed   Hypertension or Preeclampsia during pregnancy or delivery No   Outgoing Cradle Call completed:  Spoke with  while mom feeding  Mom reports

## 2021-09-01 ENCOUNTER — TELEPHONE (OUTPATIENT)
Dept: OBGYN UNIT | Facility: HOSPITAL | Age: 31
End: 2021-09-01

## 2022-01-18 NOTE — PLAN OF CARE
CARDIOVASCULAR - ADULT    • Maintains optimal cardiac output and hemodynamic stability Progressing        NEUROLOGICAL - ADULT    • Achieves stable or improved neurological status Progressing    • Absence of seizures Progressing          Rcv'd drowsy  C/o [facility]

## 2023-05-05 ENCOUNTER — OFFICE VISIT (OUTPATIENT)
Facility: CLINIC | Age: 33
End: 2023-05-05

## 2023-05-05 VITALS
DIASTOLIC BLOOD PRESSURE: 74 MMHG | HEART RATE: 87 BPM | HEIGHT: 63 IN | SYSTOLIC BLOOD PRESSURE: 111 MMHG | WEIGHT: 167 LBS | BODY MASS INDEX: 29.59 KG/M2

## 2023-05-05 DIAGNOSIS — K21.9 GASTROESOPHAGEAL REFLUX DISEASE, UNSPECIFIED WHETHER ESOPHAGITIS PRESENT: Primary | ICD-10-CM

## 2023-05-05 PROCEDURE — 3074F SYST BP LT 130 MM HG: CPT | Performed by: INTERNAL MEDICINE

## 2023-05-05 PROCEDURE — 3008F BODY MASS INDEX DOCD: CPT | Performed by: INTERNAL MEDICINE

## 2023-05-05 PROCEDURE — 3078F DIAST BP <80 MM HG: CPT | Performed by: INTERNAL MEDICINE

## 2023-05-05 PROCEDURE — 99204 OFFICE O/P NEW MOD 45 MIN: CPT | Performed by: INTERNAL MEDICINE

## 2023-05-05 RX ORDER — PANTOPRAZOLE SODIUM 40 MG/1
40 TABLET, DELAYED RELEASE ORAL
Qty: 60 TABLET | Refills: 2 | Status: SHIPPED | OUTPATIENT
Start: 2023-05-05 | End: 2023-08-03

## 2023-05-05 NOTE — PATIENT INSTRUCTIONS
Pantoprazole 40mg - 30 min before meal, twice a day for 14 days, then reduce to daily (either before breakfast or before dinner) for another 30 days   Liquid Gaviscon 1-2x a day as needed   3. If you are not better, we may need to consider an endoscopy test   4. Avoid caffeine, chocolate, peppermint, and alcohol.  Also avoid lying down flat or in a recumbent position for 3 hours after a meal.

## 2023-05-07 PROBLEM — K21.9 GASTROESOPHAGEAL REFLUX DISEASE: Status: ACTIVE | Noted: 2023-05-07

## 2023-09-13 ENCOUNTER — TELEPHONE (OUTPATIENT)
Facility: CLINIC | Age: 33
End: 2023-09-13

## 2023-09-14 RX ORDER — PANTOPRAZOLE SODIUM 40 MG/1
40 TABLET, DELAYED RELEASE ORAL
Qty: 90 TABLET | Refills: 0 | Status: SHIPPED | OUTPATIENT
Start: 2023-09-14 | End: 2023-12-13

## (undated) DEVICE — EXTRA LARGE, DISPOSABLE C-SECTION PROTECTOR - RETRACTOR: Brand: ALEXIS ® O C-SECTION PROTECTOR - RETRACTOR

## (undated) NOTE — ED AVS SNAPSHOT
[facility] Emergency Department  [place]  [address] [address]  Phone:  [phone]  Fax:  [phone]          Margarita Dubon   MRN: [id number]    Department:  [facility] Emergency Department   Date of Visit:  6/27/2017 IF THERE IS ANY CHANGE OR WORSENING OF YOUR CONDITION, CALL YOUR PRIMARY CARE PHYSICIAN AT ONCE OR RETURN IMMEDIATELY TO THE EMERGENCY DEPARTMENT.     If you have been prescribed any medication(s), please fill your prescription right away and begin taking t

## (undated) NOTE — ED AVS SNAPSHOT
[facility] AND [facility] in [address] [address] Sarbjit Xie    Phone:  [phone]    Fax:  [phone]           Wess Cranker   MRN: [id number]    Department:  [facility] AND [facility] in [address] [place] [address]   Date of Visit: and physician's office to determine coverage and benefits available for follow-up care and referrals. It is our goal to assure that you are completely satisfied with every aspect of your visit today.   In an effort to constantly improve our service to y Any imaging studies and labs completed today can be reviewed in your MyChart account. You may have had testing done that requires us to contact you. Please make sure we have your correct phone number on file.      OUR CURRENT HOURS OF OPERATION:  MONDAY T and ask to get set up for an insurance coverage that is in-network with [email or web address] [facility]. [employer]     Sign up for [employer], your secure online medical record.   [employer] will allow you to access patient instructions from your recent visit,  view